# Patient Record
Sex: MALE | Race: WHITE | NOT HISPANIC OR LATINO | URBAN - METROPOLITAN AREA
[De-identification: names, ages, dates, MRNs, and addresses within clinical notes are randomized per-mention and may not be internally consistent; named-entity substitution may affect disease eponyms.]

---

## 2022-06-14 ENCOUNTER — TELEPHONE (OUTPATIENT)
Dept: TRANSPLANT | Facility: CLINIC | Age: 57
End: 2022-06-14

## 2022-06-28 ENCOUNTER — TELEPHONE (OUTPATIENT)
Dept: TRANSPLANT | Facility: CLINIC | Age: 57
End: 2022-06-28
Payer: COMMERCIAL

## 2022-06-29 DIAGNOSIS — Z76.82 ORGAN TRANSPLANT CANDIDATE: ICD-10-CM

## 2022-07-11 ENCOUNTER — TELEPHONE (OUTPATIENT)
Dept: TRANSPLANT | Facility: CLINIC | Age: 57
End: 2022-07-11
Payer: COMMERCIAL

## 2022-07-12 ENCOUNTER — HOSPITAL ENCOUNTER (OUTPATIENT)
Dept: RADIOLOGY | Facility: HOSPITAL | Age: 57
Discharge: HOME | End: 2022-07-12
Attending: NURSE PRACTITIONER
Payer: COMMERCIAL

## 2022-07-12 ENCOUNTER — OFFICE VISIT (OUTPATIENT)
Dept: TRANSPLANT | Facility: CLINIC | Age: 57
End: 2022-07-12
Payer: COMMERCIAL

## 2022-07-12 ENCOUNTER — TELEPHONE (OUTPATIENT)
Dept: TRANSPLANT | Facility: CLINIC | Age: 57
End: 2022-07-12
Payer: COMMERCIAL

## 2022-07-12 VITALS
SYSTOLIC BLOOD PRESSURE: 158 MMHG | BODY MASS INDEX: 21.28 KG/M2 | HEART RATE: 79 BPM | DIASTOLIC BLOOD PRESSURE: 98 MMHG | OXYGEN SATURATION: 98 % | HEIGHT: 74 IN | RESPIRATION RATE: 16 BRPM | WEIGHT: 165.81 LBS | TEMPERATURE: 97 F

## 2022-07-12 DIAGNOSIS — N18.6 ESRD (END STAGE RENAL DISEASE): ICD-10-CM

## 2022-07-12 DIAGNOSIS — I34.0 MITRAL VALVE INSUFFICIENCY, UNSPECIFIED ETIOLOGY: ICD-10-CM

## 2022-07-12 DIAGNOSIS — Z01.818 PRE-TRANSPLANT EVALUATION FOR KIDNEY TRANSPLANT: Primary | ICD-10-CM

## 2022-07-12 DIAGNOSIS — Z76.82 ORGAN TRANSPLANT CANDIDATE: ICD-10-CM

## 2022-07-12 DIAGNOSIS — I10 ESSENTIAL HYPERTENSION: ICD-10-CM

## 2022-07-12 DIAGNOSIS — A52.8 LATE LATENT SYPHILIS: ICD-10-CM

## 2022-07-12 PROBLEM — U07.1 COVID-19: Status: ACTIVE | Noted: 2021-09-17

## 2022-07-12 PROCEDURE — 71046 XR CHEST PA AND LATERAL: ICD-10-PCS | Mod: 26,TXP,, | Performed by: RADIOLOGY

## 2022-07-12 PROCEDURE — 3008F PR BODY MASS INDEX (BMI) DOCUMENTED: ICD-10-PCS | Mod: CPTII,S$GLB,TXP, | Performed by: NURSE PRACTITIONER

## 2022-07-12 PROCEDURE — 99204 PR OFFICE/OUTPT VISIT, NEW, LEVL IV, 45-59 MIN: ICD-10-PCS | Mod: S$GLB,TXP,, | Performed by: TRANSPLANT SURGERY

## 2022-07-12 PROCEDURE — 99205 OFFICE O/P NEW HI 60 MIN: CPT | Mod: S$GLB,TXP,, | Performed by: NURSE PRACTITIONER

## 2022-07-12 PROCEDURE — 72170 X-RAY EXAM OF PELVIS: CPT | Mod: 26,TXP,, | Performed by: RADIOLOGY

## 2022-07-12 PROCEDURE — 1160F PR REVIEW ALL MEDS BY PRESCRIBER/CLIN PHARMACIST DOCUMENTED: ICD-10-PCS | Mod: CPTII,S$GLB,TXP, | Performed by: NURSE PRACTITIONER

## 2022-07-12 PROCEDURE — 3008F BODY MASS INDEX DOCD: CPT | Mod: CPTII,S$GLB,TXP, | Performed by: NURSE PRACTITIONER

## 2022-07-12 PROCEDURE — 1159F MED LIST DOCD IN RCRD: CPT | Mod: CPTII,S$GLB,TXP, | Performed by: NURSE PRACTITIONER

## 2022-07-12 PROCEDURE — 3080F DIAST BP >= 90 MM HG: CPT | Mod: CPTII,S$GLB,TXP, | Performed by: NURSE PRACTITIONER

## 2022-07-12 PROCEDURE — 99205 PR OFFICE/OUTPT VISIT, NEW, LEVL V, 60-74 MIN: ICD-10-PCS | Mod: S$GLB,TXP,, | Performed by: NURSE PRACTITIONER

## 2022-07-12 PROCEDURE — 72170 XR PELVIS ROUTINE AP: ICD-10-PCS | Mod: 26,TXP,, | Performed by: RADIOLOGY

## 2022-07-12 PROCEDURE — 99204 OFFICE O/P NEW MOD 45 MIN: CPT | Mod: S$GLB,TXP,, | Performed by: TRANSPLANT SURGERY

## 2022-07-12 PROCEDURE — 1160F RVW MEDS BY RX/DR IN RCRD: CPT | Mod: CPTII,S$GLB,TXP, | Performed by: NURSE PRACTITIONER

## 2022-07-12 PROCEDURE — 72170 X-RAY EXAM OF PELVIS: CPT | Mod: TC,TXP

## 2022-07-12 PROCEDURE — 3077F PR MOST RECENT SYSTOLIC BLOOD PRESSURE >= 140 MM HG: ICD-10-PCS | Mod: CPTII,S$GLB,TXP, | Performed by: NURSE PRACTITIONER

## 2022-07-12 PROCEDURE — 71046 X-RAY EXAM CHEST 2 VIEWS: CPT | Mod: TC,TXP

## 2022-07-12 PROCEDURE — 99999 PR PBB SHADOW E&M-EST. PATIENT-LVL V: CPT | Mod: PBBFAC,TXP,, | Performed by: NURSE PRACTITIONER

## 2022-07-12 PROCEDURE — 3080F PR MOST RECENT DIASTOLIC BLOOD PRESSURE >= 90 MM HG: ICD-10-PCS | Mod: CPTII,S$GLB,TXP, | Performed by: NURSE PRACTITIONER

## 2022-07-12 PROCEDURE — 1159F PR MEDICATION LIST DOCUMENTED IN MEDICAL RECORD: ICD-10-PCS | Mod: CPTII,S$GLB,TXP, | Performed by: NURSE PRACTITIONER

## 2022-07-12 PROCEDURE — 3077F SYST BP >= 140 MM HG: CPT | Mod: CPTII,S$GLB,TXP, | Performed by: NURSE PRACTITIONER

## 2022-07-12 PROCEDURE — 99999 PR PBB SHADOW E&M-EST. PATIENT-LVL V: ICD-10-PCS | Mod: PBBFAC,TXP,, | Performed by: NURSE PRACTITIONER

## 2022-07-12 PROCEDURE — 71046 X-RAY EXAM CHEST 2 VIEWS: CPT | Mod: 26,TXP,, | Performed by: RADIOLOGY

## 2022-07-12 RX ORDER — CALCIUM CARBONATE 600 MG
600 TABLET ORAL 2 TIMES DAILY
COMMUNITY
Start: 2022-05-17 | End: 2023-05-17

## 2022-07-12 RX ORDER — CARVEDILOL 6.25 MG/1
6.25 TABLET ORAL 2 TIMES DAILY
COMMUNITY
Start: 2022-05-18

## 2022-07-12 RX ORDER — UBIDECARENONE 30 MG
30 CAPSULE ORAL DAILY
COMMUNITY

## 2022-07-12 RX ORDER — ASCORBIC ACID 500 MG
500 TABLET ORAL DAILY
COMMUNITY

## 2022-07-12 RX ORDER — AMITRIPTYLINE HYDROCHLORIDE 25 MG/1
25 TABLET, FILM COATED ORAL NIGHTLY
COMMUNITY
Start: 2022-06-26 | End: 2022-07-26

## 2022-07-12 RX ORDER — SEVELAMER CARBONATE 800 MG/1
800 TABLET, FILM COATED ORAL
COMMUNITY
Start: 2022-05-18

## 2022-07-12 RX ORDER — AMLODIPINE BESYLATE 5 MG/1
5 TABLET ORAL DAILY
COMMUNITY
Start: 2022-05-18

## 2022-07-12 NOTE — PROGRESS NOTES
PHARM.D. PRE-TRANSPLANT NOTE:    This patient's medication therapy was evaluated as part of his pre-transplant evaluation.      The following general pharmacologic concerns were noted: sulfa allergy as a child, can consider rechallenging post-txp    The following concerns for post-operative pain management were noted: none    The following pharmacologic concerns related to HCV therapy were noted: none      This patient's medication profile was reviewed for considerations for DAA Hepatitis C therapy:    [x]  No current inducers of CYP 3A4 or PGP  [x]  No amiodarone on this patient's EMR profile in the last 24 months  [x]  No past or current atrial fibrillation on this patient's EMR profile       Current Outpatient Medications   Medication Sig Dispense Refill    amitriptyline (ELAVIL) 25 MG tablet Take 25 mg by mouth every evening.      amLODIPine (NORVASC) 5 MG tablet Take 5 mg by mouth once daily at 6am.      ascorbic acid, vitamin C, (VITAMIN C) 500 MG tablet Take 500 mg by mouth once daily.      calcium carbonate (OS-PEDRITO) 600 mg calcium (1,500 mg) Tab Take 600 mg by mouth 2 (two) times a day.      carvediloL (COREG) 6.25 MG tablet Take 6.25 mg by mouth 2 (two) times a day.      co-enzyme Q-10 30 mg capsule Take 30 mg by mouth once daily.      sevelamer carbonate (RENVELA) 800 mg Tab Take 800 mg by mouth 3 (three) times daily with meals.      vitamin renal formula, B-complex-vitamin c-folic acid, (NEPHROCAP) 1 mg Cap Take 1 capsule by mouth once daily at 6am.       No current facility-administered medications for this visit.           I am available for consultation and can be contacted, as needed by the other members of the Transplant team.

## 2022-07-12 NOTE — PROGRESS NOTES
Transplant Surgery  Kidney Transplant Recipient Evaluation    Referring Physician: Todd Martinez  Current Nephrologist: Todd Martinez    Subjective:     Reason for Visit: evaluate transplant candidacy    History of Present Illness: Neo Villa is a 56 y.o. year old male undergoing transplant evaluation.    Dialysis History: Neo is on peritoneal dialysis.      Transplant History: N/A    Etiology of Renal Disease: Hypertensive Nephrosclerosis (based on medical records from referral).    External provider notes reviewed: Yes    Review of Systems   Constitutional: Negative for activity change and appetite change.   HENT: Negative for congestion and tinnitus.    Eyes: Negative for redness and visual disturbance.   Respiratory: Negative for cough and shortness of breath.    Cardiovascular: Negative for chest pain and palpitations.   Gastrointestinal: Negative for abdominal distention and abdominal pain.   Endocrine: Negative for polydipsia and polyuria.   Genitourinary: Negative for decreased urine volume and dysuria.   Musculoskeletal: Negative for arthralgias and back pain.   Skin: Negative for pallor and rash.   Allergic/Immunologic: Negative for environmental allergies and immunocompromised state.   Neurological: Negative for tremors and headaches.   Hematological: Negative for adenopathy. Does not bruise/bleed easily.   Psychiatric/Behavioral: Negative for behavioral problems and confusion.     Objective:     Physical Exam:  Constitutional:   Vitals reviewed: yes   Well-nourished and well-groomed: yes  Eyes:   Sclerae icteric: no   Extraocular movements intact: yes  GI:    Bowel sounds normal: yes   Tenderness: no    If yes, quadrant/location: not applicable   Palpable masses: no    If yes, quadrant/location: not applicable   Hepatosplenomegaly: no   Ascites: no   Hernia: no    If yes, type/location: not applicable   Surgical scars: no    If yes, type/location: not applicable  Resp:   Effort normal:  yes   Breath sounds normal: yes    CV:   Regular rate and rhythm: yes   Heart sounds normal: yes   Femoral pulses normal: yes   Extremities edematous: no  Skin:   Rashes or lesions present: no    If yes, describe:not applicable   Jaundice:: no    Musculoskeletal:   Gait normal: yes   Strength normal: yes  Psych:   Oriented to person, place, and time: yes   Affect and mood normal: yes    Additional comments: not applicable    Diagnostics:  The following labs have been reviewed: CBC  CMP  The following radiology images have been independently reviewed and interpreted: CXR    Counseling: We provided Neo Villa with a group education session today.  We discussed kidney transplantation at length with him, including risks, potential complications, and alternatives in the management of his renal failure.  The discussion included complications related to anesthesia, bleeding, infection, primary nonfunction, and ATN.  I discussed the typical postoperative course, length of hospitalization, the need for long-term immunosuppression, and the need for long-term routine follow-up.  I discussed living-donor and -donor transplantation and the relative advantages and disadvantages of each.  I also discussed average waiting times for both living donation and  donation.  I discussed national and center-specific survival rates.  I also mentioned the potential benefit of multicenter listing to candidates listed with centers within more than one organ procurement organization.  All questions were answered.    Patient advised that it is recommended that all transplant candidates, and their close contacts and household members receive Covid vaccination.    Final determination of transplant candidacy will be made once evaluation is complete and reviewed by the Kidney & Kidney/Pancreas Selection Committee.    Coronavirus disease (COVID-19) caused by severe acute respiratory virus coronavirus 2 (SARS-C0V 2) is associated  with increased mortality in solid organ transplant recipients (SOT) compared to non-transplant patients. Vaccine responses to vaccination are depressed against SARS-CoV2 compared to normal individuals but improve with third vaccination doses. Vaccination prior to SOT provides both the best opportunity for transplant candidates to develop protective immunity and to reduce the risk of serious COVID19 infections post transplantation. Organ transplant candidates at [facility] will be required to receive SARS-CoV-2 vaccination prior to being listed with a an active status, whenever possible. Exceptions will be made for disability related reasons or for sincerely held [employer] beliefs.          Transplant Surgery - Candidacy   Assessment/Plan:   Neo Villa has end stage renal disease (ESRD) on dialysis. I see no surgical contraindication to placing a kidney transplant. Based on available information, Neo Villa is a suitable kidney transplant candidate.     Additional testing to be completed according to the Written Order Guidelines for Adult Pre-kidney and Pancreas Transplant Evaluation (KI-02).  Interpretation of tests and discussion of patient management involves all members of the multidisciplinary transplant team.    Ervin Parsons MD

## 2022-07-12 NOTE — PROGRESS NOTES
Transplant Nephrology  Kidney Transplant Recipient Evaluation    Referring Physician: Todd Martinez  Current Nephrologist: Todd Martinez    Subjective:   CC:  Initial evaluation of kidney transplant candidacy.    HPI:  Mr. Villa is a 56 y.o. year old White male who has presented to be evaluated as a potential kidney transplant recipient.  He has ESRD secondary to HTN.  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 Patient is dialyzing on cyclic peritoneal dialysis.  Patient reports that he is tolerating dialysis well. . He has a PD catheter for dialysis access. Denies peritonitis or exit site infections.    ESRD from longstanding HTN, had kidney biopsy 5/6/2022 at      Echo 5/17/2022:There is severe mitral regurgitation. Flail posterior leaflet of the mitral valve (moderately thickened) with severe eccentric mitral regurgitation.     Had visit with  cardiologist 5/23: PLAN: MR: Severe with flail. Patient is not interested in pursuing angiography right now due to concern for his anemia and bleeding risk (renal bleed post biopsy). Patient would prefer to come back in 3 weeks to further discuss angiogram. I stressed that there is some concern for waiting too long in regards to the workup.     He would like to get second opinion from [facility] cardiologist before undergoing angio or surgery.    Has never had colonoscopy.    Very active and functional. [place].     Possible donors.     Current Outpatient Medications   Medication Sig Dispense Refill    amitriptyline (ELAVIL) 25 MG tablet Take 25 mg by mouth every evening.      amLODIPine (NORVASC) 5 MG tablet Take 5 mg by mouth once daily at 6am.      ascorbic acid, vitamin C, (VITAMIN C) 500 MG tablet Take 500 mg by mouth once daily.      calcium carbonate (OS-PEDRITO) 600 mg calcium (1,500 mg) Tab Take 600 mg by mouth 2 (two) times a day.      carvediloL  (COREG) 6.25 MG tablet Take 6.25 mg by mouth 2 (two) times a day.      co-enzyme Q-10 30 mg capsule Take 30 mg by mouth once daily.      sevelamer carbonate (RENVELA) 800 mg Tab Take 800 mg by mouth 3 (three) times daily with meals.      vitamin renal formula, B-complex-vitamin c-folic acid, (NEPHROCAP) 1 mg Cap Take 1 capsule by mouth once daily at 6am.       No current facility-administered medications for this visit.       Past Medical History:   Diagnosis Date    Anemia     Hydrocele     Hyperlipidemia     Hypertension     Hypertensive retinopathy     Secondary hyperparathyroidism     Syphilis     Vitamin D deficiency          Past Medical and Surgical History: Mr. Villa  has a past medical history of Anemia, Hydrocele, Hyperlipidemia, Hypertension, Hypertensive retinopathy, Secondary hyperparathyroidism, Syphilis, and Vitamin D deficiency.  He has a past surgical history that includes Peritoneal catheter insertion and Renal biopsy.    Past Social and Family History: Mr. Villa reports that he has never smoked. He has never used smokeless tobacco. He reports previous alcohol use. He reports that he does not use drugs. His He was adopted. Family history is unknown by patient.     Review of Systems   Constitutional: Negative for activity change, appetite change and fever.   HENT: Negative for congestion, mouth sores and sore throat.    Eyes: Negative for visual disturbance.   Respiratory: Negative for cough, chest tightness and shortness of breath.    Cardiovascular: Negative for chest pain, palpitations and leg swelling.   Gastrointestinal: Negative for abdominal distention, abdominal pain, constipation, diarrhea and nausea.   Genitourinary: Negative for difficulty urinating, frequency and hematuria.   Musculoskeletal: Negative for arthralgias and gait problem.   Skin: Negative for wound.   Allergic/Immunologic: Negative for environmental allergies, food allergies and immunocompromised state.  "  Neurological: Negative for dizziness, weakness and numbness.   Psychiatric/Behavioral: Negative for sleep disturbance. The patient is not nervous/anxious.        Objective:   Blood pressure (!) 158/98, pulse 79, temperature 97.2 °F (36.2 °C), temperature source Tympanic, resp. rate 16, height 6' 2.41" (1.89 m), weight 75.2 kg (165 lb 12.6 oz), SpO2 98 %.body mass index is 21.05 kg/m².    Physical Exam  Vitals and nursing note reviewed.   Constitutional:       Appearance: Normal appearance.   HENT:      Head: Normocephalic.   Cardiovascular:      Rate and Rhythm: Normal rate and regular rhythm.      Heart sounds: Normal heart sounds.   Pulmonary:      Effort: Pulmonary effort is normal.      Breath sounds: Normal breath sounds.   Abdominal:      General: Bowel sounds are normal. There is no distension.      Palpations: Abdomen is soft.      Tenderness: There is no abdominal tenderness.      Comments: PD catheter , no erythema, edema, tenderness or drainage.    Musculoskeletal:         General: Normal range of motion.   Skin:     General: Skin is warm and dry.   Neurological:      General: No focal deficit present.      Mental Status: He is alert.   Psychiatric:         Behavior: Behavior normal.         Labs:  Lab Results   Component Value Date    WBC 4.37 07/12/2022    HGB 8.3 (L) 07/12/2022    HCT 24.9 (L) 07/12/2022     07/12/2022    K 3.5 07/12/2022     07/12/2022    CO2 24 07/12/2022     (H) 07/12/2022    CREATININE 14.5 (H) 07/12/2022    EGFRNONAA 3.3 (A) 07/12/2022    CALCIUM 9.3 07/12/2022    PHOS 5.4 (H) 07/12/2022    ALBUMIN 3.6 07/12/2022    AST 17 07/12/2022    ALT 14 07/12/2022    .9 (H) 07/12/2022     Labs were reviewed with the patient.    Assessment:     1. Pre-transplant evaluation for kidney transplant    2. ESRD (end stage renal disease)    3. Essential hypertension    4. Mitral valve insufficiency, unspecified etiology    5. Late latent syphilis    6. BMI 21.0-21.9, " adult        Plan:   Prior to Listing, will need the following items to be completed:  1. Standard serologies, cardiac and imaging studies   2. Cardiac clearance   3. Colonoscopy      Transplant Candidacy:   Based on available information, Mr. Villa is a suitable kidney transplant candidate.   Meets center eligibility for accepting HCV+ donor offer - yes.  Patient educated on HCV+ donors. Neo is willing to accept HCV+ donor offer - yes   Patient is a candidate for KDPI > 85 kidney donor offer - yes.  Final determination of transplant candidacy will be made once workup is complete and reviewed by the selection committee.    Patient advised that it is recommended that all transplant candidates, and their close contacts and household members receive Covid vaccination.    Rebeca Clements, LARISA       UNOS Patient Status  Functional Status: 90% - Able to carry on normal activity: minor symptoms of disease  Physical Capacity: No Limitations

## 2022-07-12 NOTE — LETTER
July 14, 2022        Todd Martinez  [address]  [place] [address]  Phone: [phone]  Fax: [phone]             [facility] 1st Fl  [address]  [facility] [address]  Phone: [phone]   Patient: Neo Villa   MR Number: [id number]   YOB: 1965   Date of Visit: 7/12/2022       Dear Dr. Todd Martinez    Thank you for referring Neo Villa to me for evaluation. Attached you will find relevant portions of my assessment and plan of care.    If you have questions, please do not hesitate to call me. I look forward to following Neo Villa along with you.    Sincerely,    Rebeca Clements, LARISA    Enclosure    If you would like to receive this communication electronically, please contact [email or web address] or [phone] to request [employer] access.    [employer] is a tool which provides read-only access to select patient information with whom you have a relationship. Its easy to use and provides real time access to review your patients record including encounter summaries, notes, results, and demographic information.    If you feel you have received this communication in error or would no longer like to receive these types of communications, please e-mail [email or web address]

## 2022-07-12 NOTE — PROGRESS NOTES
INITIAL PATIENT EDUCATION NOTE    Mr. Neo Villa was seen in pre-kidney transplant clinic for evaluation for kidney, kidney/pancreas or pancreas only transplant.  The patient attended a an individual video education session that discussed/reviewed the following aspects of transplantation: evaluation and selection committee process, UNOS waitlist management/multiple listings, types of organs offered (KDPI < 85%, KDPI > 85%, PHS risk, DCD, HCV+, HIV+ for HIV+ recipients and enbloc/dual), financial aspects, surgical procedures, dietary instruction pre- and post-transplant, health maintenance pre- and post-transplant, post-transplant hospitalization and outpatient follow-up, potential to participate in a research protocol, and medication management and side effects.  A question and answer session was provided after the presentation.    The patient was seen by all members of the multi-disciplinary team to include: Nephrologist/PA, Surgeon, , Transplant Coordinator, , Pharmacist and Dietician (if applicable).    The patient reviewed and signed all consents for evaluation which were witnessed and sent to scanning into the EPIC chart.    The patient was given an education book and plan for further evaluation based on his individual assessment.      Reviewed program requirement for complete COVID vaccination with documentation prior to listing.  COVID education information reviewed with patient.     The patient was informed that the transplant team would manage immediate post op pain. If the patient requires long term pain management, they will need to have that pain management addressed by their PCP or previous provider who wrote for long term pain medicines.    The patient was encouraged to call with any questions or concerns.

## 2022-07-12 NOTE — TELEPHONE ENCOUNTER
Reviewed pt transplant labs.  Notified dialysis unit dietitian of the following abnormal labs via fax and requested their most recent nutrition note on this pt.  Once this note is received it will be scanned into pt's chart.    Phos 5.4

## 2022-07-13 NOTE — PROGRESS NOTES
"Transplant Recipient Adult Psychosocial Assessment    [address]  [place] [address]  Telephone Information:   Mobile [phone]   Home  [phone] (home)  Work  There is no work phone number on file.  E-mail  [email or web address]    Sex: male  YOB: 1965  Age: 56 y.o.    Encounter Date: 2022  [place] 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 [phone]    Family/Social Support:   Number of dependents/: pt denies  Marital history: pt reports is not   Other family dynamics: Pt reports is adopted. Pt reports does not know any information about biological parents/family. Pt reports adopted father is . Pt reports adopted mother is suffering from dementia and will be placed in assisted living in [facility] this coming weekend (2022). Pt reports only one sibling and is not in contact with her. 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 Pt reports Tee will be primary transplant caregiver and will notify transplant SW back up transplant caregiver plan once developed.    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 [phone]    Do you and your caregivers have access to reliable transportation? yes  PRIMARY CAREGIVER: Tee Hanley, long time girlfriend, will be primary caregiver, phone number [phone]     provided in-depth information to patient and caregiver regarding pre- and post-transplant caregiver role.   " strongly encourages patient and caregiver to have concrete plan regarding post-transplant care giving, including back-up caregiver(s) to ensure care giving needs are met as needed.    Patient and Caregiver states understanding all aspects of caregiver role/commitment and is able/willing/committed to being caregiver to the fullest extent necessary.    Patient and Caregiver verbalizes understanding of the education provided today and caregiver responsibilities.         remains available. Patient and Caregiver agree to contact  in a timely manner if concerns arise.      Able to take time off work without financial concerns: yes.     Additional Significant Others who will Assist with Transplant:  Pt denies having back up caregiver plan at this time. Pt to provide back up transplant caregiver plan to transplant SW once it is developed.    Living Will: no  Healthcare Power of : yes Tee Hanley, long time girlfriend, [phone]  Advance Directives on file: <<no information> per medical record.  Verbally reviewed LW/HCPA information.   provided patient with copy of LW/HCPA documents and provided education on completion of forms.    Living Donors: Education and resource information given to patient.    Highest Education Level: Attended College/Technical School attended 2 years of college and did not graduate.  Reading Ability: college  Reports difficulty with: hearing Pt had difficulty at times hearing questions in psychosocial -- transplant SW had to repeat questions about 4 times.  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    Spouse/Significant Other Employment: pt reports is not .    Disabled: no Pt reports plan soon to discuss disability with  dialysis unit SW.    Monthly Income:  Estimate monthly earned income: $5,500  Able to afford all costs now and if transplanted, including medications: yes  Patient and Caregiver verbalizes understanding of personal responsibilities related to transplant costs and the importance of having a financial plan to ensure that patients transplant costs are fully covered.       provided fundraising information/education. Patient and Caregiververbalizes understanding.   remains available.    Insurance:   Payer/Plan Subscr  Sex Relation Sub. Ins. ID Effective Group Num   1. BLUE CROSS BL* LAUREN OLIVER 1965 Male Self [id number] 1/1/22 [id number]                                   [address] [address]     Primary Insurance (for UNOS reporting): Private Insurance  Secondary Insurance (for UNOS reporting): None Pt reports is on dialysis but not on Medicare. Pt reports plan to soon discuss Medicare enrollment with dialysis unit SW. Medicare book provided today.  Patient and Caregiver verbalizes clear understanding that patient may experience difficulty obtaining and/or be denied insurance coverage post-surgery. This includes and is not limited to disability insurance, life insurance, health insurance, burial insurance, long term care insurance, and other insurances.      Patient and Caregiver also reports understanding that future health concerns related to or unrelated to transplantation may not be covered by patient's insurance.  Resources and information provided and reviewed.     Patient and Caregiver provides verbal permission to release any necessary information to outside resources for patient care and discharge planning.  Resources and information provided are reviewed.      [facility], [phone]. PD    Dialysis Adherence: Pt reports high dialysis compliance with treatments and instructions within last 3 months. Dialysis compliance update requested.    Infusion  Service: patient utilizing? no  Home Health: patient utilizing? no  DME: yes PD equipment and supplies  Pulmonary/Cardiac Rehab: pt denies  ADLS:  Independent with self care, medication management and does drive self/own car    Adherence:   Pt reports high medical compliance with appointments and instructions in last 3 months. Adherence education and counseling provided.     Per History Section:  Past Medical History:   Diagnosis Date    Anemia     Hydrocele     Hyperlipidemia     Hypertension     Hypertensive retinopathy     Secondary hyperparathyroidism     Syphilis     Vitamin D deficiency      Social History     Tobacco Use    Smoking status: Never Smoker    Smokeless tobacco: Never Used   Substance Use Topics    Alcohol use: Not Currently     Social History     Substance and Sexual Activity   Drug Use Never     Social History     Substance and Sexual Activity   Sexual Activity Not on file       Per Today's Psychosocial:  Tobacco: none, patient denies any use. Pt reports plan to abstain.  Alcohol: none, patient denies any use at this time. Pt reports previous social alcohol use. Pt reports plan to abstain.  Illicit Drugs/Non-prescribed Medications: Pt reports previous recreational cocaine use (snort) over 2004. Pt reports discontinued all use on own without participating in drug rehab. Pt reports plan to abstain.    Patient and Caregiver states clear understanding of the potential impact of substance use as it relates to transplant candidacy and is aware of possible random substance screening.  Substance abstinence/cessation counseling, education and resources provided and reviewed.     Arrests/DWI/Treatment/Rehab: patient denies    Psychiatric History:    Mental Health: Pt denies any mental health history or current mental health concerns. Pt denies any need for mental health referral at this time.  Psychiatrist/Counselor: pt denies  Medications:  Elavil 25mg for sleep prescribed by PCP. Pt denies  any other mental health medicines.  Suicide/Homicide Issues: pt denies any si/hi history   Safety at home: Pt reports living in safe home environment with no abuse.    Knowledge: Patient and Caregiver states having clear understanding and realistic expectations regarding the potential risks and potential benefits of organ transplantation and organ donation and agrees to discuss with health care team members and support system members, as well as to utilize available resources and express questions and/or concerns in order to further facilitate the pt informed decision-making.  Resources and information provided and reviewed.    Patient and Caregiver is aware of [facility]'s affiliation and/or partnership with agencies in home health care, LTAC, SNF, [facility], and other hospitals and clinics.    Understanding: Patient and Caregiver reports having a clear understanding of the many lifetime commitments involved with being a transplant recipient, including costs, compliance, medications, lab work, procedures, appointments, concrete and financial planning, preparedness, timely and appropriate communication of concerns, abstinence (ETOH, tobacco, illicit non-prescribed drugs), adherence to all health care team recommendations, support system and caregiver involvement, appropriate and timely resource utilization and follow-through, mental health counseling as needed/recommended, and patient and caregiver responsibilities.  Social Service Handbook, resources and detailed educational information provided and reviewed.  Educational information provided.    Patient and Caregiver also reports current and expected compliance with health care regime and states having a clear understanding of the importance of compliance.      Patient and Caregiver reports a clear understanding that risks and benefits may be involved with organ transplantation and with organ donation.       Patient and Caregiver also reports clear understanding that  psychosocial risk factors may affect patient, and include but are not limited to feelings of depression, generalized anxiety, anxiety regarding dependence on others, post traumatic stress disorder, feelings of guilt and other emotional and/or mental concerns, and/or exacerbation of existing mental health concerns.  Detailed resources provided and discussed.      Patient and Caregiver agrees to access appropriate resources in a timely manner as needed and/or as recommended, and to communicate concerns appropriately.  Patient and Caregiver also reports a clear understanding of treatment options available.     Patient and Caregiver received education in a group setting.   reviewed education, provided additional information, and answered questions.    Feelings or Concerns: Pt reports high motivation to pursue kidney organ transplant at this time.    Coping: Identify Patient & Caregiver Strategies to [place]:   1. Currently & Pre-transplant - long time girlfriend support; friend system support; walks for exercise; plays music (was in a band in his youth)   2. At the time of surgery - long time girlfriend support; friend system support   3. During post-Transplant & Recovery Period -  long time girlfriend support; friend system support    Goals: Pt reports hope for successful kidney organ transplant so he may discontinue dialysis and have a healthier life. Pt reports plan to return to work once transplanted, recovered and cleared. Pt reports enjoys traveling. Patient referred to Vocational Rehabilitation.    Interview Behavior: Patient and Caregiver presents as alert and oriented x 4, pleasant, good eye contact, well groomed, recall good, concentration/judgement good, average intelligence, calm, communicative, cooperative and asking and answering questions appropriately. Pt's highly supportive long time girlfriend Osei in session with patient's permission.         Transplant Social Work -  Candidacy  Assessment/Plan:     Psychosocial Suitability: Patient presents as low to medium risk candidate for kidney transplant at this time. Based on psychosocial risk factors, patient presents as low to medium risk candidate due to having limited transplant caregiver support. Pt reports plan to devise back up transplant caregiver plan and will notify transplant SW once arranged. Pt reports having organ transplant primary transplant caregiver/transportation plan, medical insurance plan and plan to afford transplant costs all in place.    Recommendations/Additional Comments: Dialysis compliance update. Pt reports plan to devise back up transplant caregiver plan and will notify transplant SW once arranged. Pt reports is not on disability but reports plan to discuss it soon with dialysis unit SW. Pt reports is on dialysis but not on Medicare at this time but reports plan to discuss it soon with dialysis SW.    SW recommends that pt conduct fundraising to assist pt with pay for cost of medications, food, gas, and other transplant related needs. SW recommends that pt remain aware of potential mental health concerns and contact the team if any concerns arise. SW recommends that pt remain abstinent from tobacco, ETOH, and drug use. SW supports pt's continued adherence. SW remains available to answer any questions or concerns that arise as the pt moves through the transplant process.     Final determination of transplant candidacy will be reviewed by the selection committee.     María MACIEL LCSW

## 2022-07-21 ENCOUNTER — TELEPHONE (OUTPATIENT)
Dept: TRANSPLANT | Facility: CLINIC | Age: 57
End: 2022-07-21
Payer: COMMERCIAL

## 2022-07-21 DIAGNOSIS — Z76.82 ORGAN TRANSPLANT CANDIDATE: Primary | ICD-10-CM

## 2022-07-21 NOTE — TELEPHONE ENCOUNTER
----- Message from Rebeca Clements NP sent at 7/21/2022 11:56 AM CDT -----  Regarding: RE: renal us review  EJ renal US is fine, just put it in the work up. He will need 6 month f/u US for septated cyst left kidney. Not sure why iliacs was checked, but I don't think he needs then but let surgeon decide for sure. Thanks    ----- Message -----  From: Jeanine Browne RN  Sent: 7/21/2022  10:38 AM CDT  To: Rebeca Clements NP  Subject: renal us review                                  Hi Carolina Jose RR pt from 7/12/22 previously had KUS in May 2022 in . He had multiple scans that month due to hematoma past kbx. I wasn't sure if you reviewed the KUS and/or ct scans. I just want to be sure that he does not need f/u imaging. Please advise how to proceed. FYI the pt did not have bilat iliac us on clinic day although it was marked on the order sheet. I sent a msg to Dr Pan if it is still necessary after reviewing pelvis us and ct abd.    Thank You,  Jeanine

## 2022-07-28 ENCOUNTER — SOCIAL WORK (OUTPATIENT)
Dept: TRANSPLANT | Facility: CLINIC | Age: 57
End: 2022-07-28
Payer: COMMERCIAL

## 2022-07-28 NOTE — PROGRESS NOTES
"Follow up regarding back up transplant caregiver/transportation plan.    Transplant SW call patient [phone] today. Pt reports does not have back up transplant caregiver plan arranged yet. Will contact transplant SW with back up transplant caregiver information once known.    Pt reports learned while at [facility] for kidney problems that he "needs a mitral valve replacement". Pt has cardiology appointment 8-9-22 for organ transplant evaluation. Pt asked for nurse coordinator to call him about the cardiology appointment. Notified nurse coordinator of same via Epic message.    María Holm MSW LCSW    "

## 2022-08-10 ENCOUNTER — TELEPHONE (OUTPATIENT)
Dept: GASTROENTEROLOGY | Facility: CLINIC | Age: 57
End: 2022-08-10
Payer: COMMERCIAL

## 2022-08-10 NOTE — TELEPHONE ENCOUNTER
Pt wife called to schedule procedure   She stats hes in Intensive care and was trying to get his procedure schedule within 3-4 weeks   Please call Dean Chandra at [phone] or [phone]  Thanks

## 2022-08-16 ENCOUNTER — TELEPHONE (OUTPATIENT)
Dept: TRANSPLANT | Facility: CLINIC | Age: 57
End: 2022-08-16
Payer: COMMERCIAL

## 2022-08-16 NOTE — TELEPHONE ENCOUNTER
Discussed with Dr Brannon. Patient kidney encounter to be closed due to not a candidate: currently in hospital with pericardial effusion s/p pericardiocentesis, afib with rvr, NSTEMI, ileus awaiting mitral valve repair. Dr Brannon stated patient will be required to be event free for at least six months past resolution of the aforesaid and return with cardiology clearance.

## 2022-08-19 ENCOUNTER — COMMITTEE REVIEW (OUTPATIENT)
Dept: TRANSPLANT | Facility: CLINIC | Age: 57
End: 2022-08-19
Payer: COMMERCIAL

## 2022-08-19 ENCOUNTER — TELEPHONE (OUTPATIENT)
Dept: TRANSPLANT | Facility: CLINIC | Age: 57
End: 2022-08-19
Payer: COMMERCIAL

## 2022-08-19 NOTE — TELEPHONE ENCOUNTER
Spoke to patient's spouse Dean Chandra due to patient in hospital. This Rn explained the committee's decision to Dean Chandra and she voiced understanding and all questions were answered.

## 2022-08-19 NOTE — LETTER
August 19, 2022    Neo Villa  [address] [place] [address]    Dear Neo Villa:  MRN: [id number]    It is the duty of the [facility] Selection Committee to determine which patients are candidates for a transplant. For this reason, our committee has the difficult task of evaluating patients to determine which ones have the greatest chance of having a successful transplant. We are aware of the magnitude of this responsibility, and we approach it with reverence and humility.    It is with regret I inform you that you are not approved as a transplant candidate due to current hospital admission with pericardial effusion status post pericardiocentesis, atrial fibrillation with rapid ventricular rate, non-ST-elevation myocardial infarction ( NSTEMI) , ileus and severe mitral regurgitation awaiting mitral valve repair. You may be re-referred once aforesaid is resolved but not before six months. On referral you must be free from any cardiac events for at least 6 months and provide cardiology clearance.     Your future transplant appointments for 8/23/22 have been canceled.  Based on this review, we have determined that at this time, you are not a candidate for a transplant at [facility].      The selection committee carefully considers each patient's transplant candidacy and determines whether it is safe to proceed with transplantation on a case-by-case basis using established selection criteria.  At present, the risk of proceeding with an elective transplant surgery has become too high.                                                                               Although the selection committee believes you are not a suitable transplant candidate, you have the option to be evaluated at other transplant centers who may have different selection criteria.  You may request your [facility] records be sent to any center of your choice by contacting our Medical Records Department at [phone].                                                                                Attached is a letter from the United Network for Organ Sharing (UNOS).  It describes the services and information offered to patients by UNOS and the Organ Procurement and Transplant Network.    The [facility] Committee sincerely wishes you the best and remains available to answer any questions.  Please do not hesitate to contact our pre-transplant office if we can assist you in any other way.                                                                               Sincerely,      Nneka Wesley MD  Medical Director, Kidney & Kidney/Pancreas Transplantation    CC:  Dr. Todd [facility]     Encl: UNOS Letter               The Organ Procurement and Transplantation Network   Toll-free patient services line: Your resource for organ transplant information     If you have a question regarding your own medical care, you always should call your transplant hospital first. However, for general organ transplant-related information, you can call the Organ Procurement and Transplantation Network (OPTN) toll-free patient services line at [phone].     Anyone, including potential transplant candidates, candidates, recipients, family members, friends, living donors, and donor family members, can call this number to:     Talk about organ donation, living donation, the transplant process, the donation process, and transplant policies.   Get a free patient information kit with helpful booklets, waiting list and transplant information, and a list of all transplant hospitals.   Ask questions about the OPTN website (https://optn.transplant.hrsa.gov/), the United Network for Organ Sharings (UNOS) website (https://unos.org/), or the UNOS website for living donors and transplant recipients. (https://www.transplantliving.org/).   Learn how the OPTN can help you.   Talk about any concerns that you may have with a  transplant hospital.     The nations transplant system, the OPTN, is managed under federal contract by the United Network for Organ Sharing (UNOS), which is a non-profit charitable organization. The OPTN helps create and define organ sharing policies that make the best use of donated organs. This process continuously evaluating new advances and discoveries so policies can be adapted to best serve patients waiting for transplants. To do so, the OPTN works closely with transplant professionals, transplant patients, transplant candidates, donor families, living donors, and the public. All transplant programs and organ procurement organizations throughout the country are OPTN members and are obligated to follow the policies the OPTN creates for allocating organs.     The OPTN also is responsible for:   Providing educational material for patients, the public, and professionals.   Raising awareness of the need for donated organs and tissue.   Coordinating organ procurement, matching, and placement.   Collecting information about every organ transplant and donation that occurs in the [place].     Remember, you should contact your transplant hospital directly if you have questions or concerns about your own medical care including medical records, work-up progress, and test results.     We are not your transplant hospital, and our staff will not be able to answer questions about your case, so please keep your transplant hospitals phone number handy.   However, while you research your transplant needs and learn as much as you can about transplantation and donation, we welcome your call to our toll-free patient services line at [phone].

## 2022-08-19 NOTE — COMMITTEE REVIEW
Native Organ Dx: Hypertensive Nephrosclerosis      Not approved for LRD/CAD transplant due to current hospital admission with pericardial effusion status post pericardiocentesis, atrial fibrillation with rapid ventricular rate, NSTEMI, ileus and severe mitral regurgitation awaiting mitral valve repair. Patient may be re-referred once aforesaid is resolved but not before six months. On referral patient must be free from any cardiac events for at least 6 months and provide cardiology clearance.      Note written by CABRERA Kumar, PRANEETH    ===============================================  I was present at the meeting and attest to the decision of the committee    Marcela Waldrop DO  Transplant Nephrology

## 2022-08-24 ENCOUNTER — TELEPHONE (OUTPATIENT)
Dept: TRANSPLANT | Facility: CLINIC | Age: 57
End: 2022-08-24
Payer: COMMERCIAL

## 2022-08-24 NOTE — TELEPHONE ENCOUNTER
Spoke with patient's wife, explained that donors can not be tested until the recipient is actively being evaluated. All questions were answered.    ----- Message from Soledad Mittal RN sent at 8/23/2022  4:01 PM CDT -----  Regarding: FW: speak with office  Contact: juan carlos    ----- Message -----  From: Dionna Love  Sent: 8/23/2022  10:06 AM CDT  To: [facility] Kidney Living Donor Coordinator  Subject: speak with office                                Pt wife is calling to speak with coordination  about issue with  the link with possible donors